# Patient Record
Sex: MALE | Race: OTHER | HISPANIC OR LATINO | URBAN - METROPOLITAN AREA
[De-identification: names, ages, dates, MRNs, and addresses within clinical notes are randomized per-mention and may not be internally consistent; named-entity substitution may affect disease eponyms.]

---

## 2018-06-16 ENCOUNTER — EMERGENCY (EMERGENCY)
Facility: HOSPITAL | Age: 23
LOS: 1 days | Discharge: HOME | End: 2018-06-16
Attending: EMERGENCY MEDICINE
Payer: MEDICAID

## 2018-06-16 VITALS
DIASTOLIC BLOOD PRESSURE: 91 MMHG | OXYGEN SATURATION: 97 % | SYSTOLIC BLOOD PRESSURE: 136 MMHG | RESPIRATION RATE: 20 BRPM | HEART RATE: 99 BPM | TEMPERATURE: 98 F

## 2018-06-16 VITALS — HEIGHT: 69 IN | WEIGHT: 199.96 LBS

## 2018-06-16 PROBLEM — Z00.00 ENCOUNTER FOR PREVENTIVE HEALTH EXAMINATION: Status: ACTIVE | Noted: 2018-06-16

## 2018-06-16 PROCEDURE — 99282 EMERGENCY DEPT VISIT SF MDM: CPT

## 2018-06-16 NOTE — ED PROVIDER NOTE - PHYSICAL EXAMINATION
Right arm: + multiple superficial abrasions to right arm, no palpable or visualized foreign bodies noted , NVI distally, + FROM of right arm, no erythema, no swelling, no bony tenderness
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
## 2018-06-16 NOTE — ED PROVIDER NOTE - MEDICAL DECISION MAKING DETAILS
22 y/o male presents with multiple superficial wounds to right arm, refusing x-rays however no palpable or visualized foreign bodies noted, PT educated that there is still a possibility of foreign body and warning signs discussed,, local wound care, follow up pmd , [facility] was in ED to take report

## 2018-06-16 NOTE — ED PROVIDER NOTE - PROGRESS NOTE DETAILS
PT refused x-rays PT refused x-rays, PT does not believe there is any retained foreign bodies in his arm
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
## 2023-06-06 ENCOUNTER — OFFICE (OUTPATIENT)
Dept: URBAN - METROPOLITAN AREA CLINIC 6 | Facility: CLINIC | Age: 28
Setting detail: OPHTHALMOLOGY
End: 2023-06-06
Payer: COMMERCIAL

## 2023-06-06 DIAGNOSIS — H40.1132: ICD-10-CM

## 2023-06-06 DIAGNOSIS — Q15.0: ICD-10-CM

## 2023-06-06 PROCEDURE — 99213 OFFICE O/P EST LOW 20 MIN: CPT | Performed by: OPHTHALMOLOGY

## 2023-06-06 ASSESSMENT — SPHEQUIV_DERIVED
OD_SPHEQUIV: 1
OS_SPHEQUIV: -1
OS_SPHEQUIV: -1.625
OS_SPHEQUIV: -0.875
OD_SPHEQUIV: 0.75
OD_SPHEQUIV: 1

## 2023-06-06 ASSESSMENT — REFRACTION_MANIFEST
OD_VA1: 20/40
OS_SPHERE: -0.50
OD_SPHERE: +2.00
OD_CYLINDER: -2.00
OD_AXIS: 95
OD_CYLINDER: -2.50
OD_AXIS: 95
OS_VA1: 20/50
OS_AXIS: 105
OD_VA1: 20/40
OS_CYLINDER: -0.75
OS_SPHERE: -0.50
OS_VA1: 20/50
OD_SPHERE: +2.25
OS_CYLINDER: -1.00
OS_AXIS: 105

## 2023-06-06 ASSESSMENT — REFRACTION_AUTOREFRACTION
OD_AXIS: 100
OS_SPHERE: -1.25
OS_AXIS: 95
OD_SPHERE: +2.00
OS_CYLINDER: -0.75
OD_CYLINDER: -2.50

## 2023-06-06 ASSESSMENT — REFRACTION_CURRENTRX
OS_OVR_VA: 20/
OD_SPHERE: +2.00
OS_AXIS: 110
OD_CYLINDER: -2.00
OD_AXIS: 98
OS_VPRISM_DIRECTION: SV
OD_OVR_VA: 20/
OS_SPHERE: -0.50
OS_CYLINDER: -1.00
OD_VPRISM_DIRECTION: SV

## 2023-06-06 ASSESSMENT — KERATOMETRY
OD_AXISANGLE_DEGREES: 12
OS_AXISANGLE_DEGREES: 162
OS_K2POWER_DIOPTERS: 42.00
OD_K2POWER_DIOPTERS: 43.00
OD_K1POWER_DIOPTERS: 40.25
OS_K1POWER_DIOPTERS: 41.50
METHOD_AUTO_MANUAL: AUTO

## 2023-06-06 ASSESSMENT — TONOMETRY
OS_IOP_MMHG: 21
OD_IOP_MMHG: 21

## 2023-06-06 ASSESSMENT — AXIALLENGTH_DERIVED
OS_AL: 24.6184
OD_AL: 23.8948
OD_AL: 23.9952
OS_AL: 24.9407
OS_AL: 24.6715
OD_AL: 23.8948

## 2023-06-06 ASSESSMENT — PACHYMETRY
OD_CT_UM: 569
OD_CT_CORRECTION: -1
OS_CT_UM: 553
OS_CT_CORRECTION: -1

## 2023-06-06 ASSESSMENT — VISUAL ACUITY
OD_BCVA: 20/60
OS_BCVA: 20/40-1

## 2023-06-06 ASSESSMENT — CONFRONTATIONAL VISUAL FIELD TEST (CVF)
OS_FINDINGS: FULL
OD_FINDINGS: FULL

## 2023-09-15 ENCOUNTER — OFFICE (OUTPATIENT)
Dept: URBAN - METROPOLITAN AREA CLINIC 6 | Facility: CLINIC | Age: 28
Setting detail: OPHTHALMOLOGY
End: 2023-09-15
Payer: COMMERCIAL

## 2023-09-15 DIAGNOSIS — Q15.0: ICD-10-CM

## 2023-09-15 DIAGNOSIS — H40.1132: ICD-10-CM

## 2023-09-15 PROCEDURE — 92083 EXTENDED VISUAL FIELD XM: CPT | Performed by: OPHTHALMOLOGY

## 2023-09-15 PROCEDURE — 92133 CPTRZD OPH DX IMG PST SGM ON: CPT | Performed by: OPHTHALMOLOGY

## 2023-09-15 PROCEDURE — 99213 OFFICE O/P EST LOW 20 MIN: CPT | Performed by: OPHTHALMOLOGY

## 2023-09-15 ASSESSMENT — VISUAL ACUITY
OS_BCVA: 20/60-1
OD_BCVA: 20/60-1

## 2023-09-15 ASSESSMENT — CONFRONTATIONAL VISUAL FIELD TEST (CVF)
OD_FINDINGS: FULL
OS_FINDINGS: FULL

## 2023-09-15 ASSESSMENT — AXIALLENGTH_DERIVED
OS_AL: 24.6214
OD_AL: 24.2527
OS_AL: 24.8355
OD_AL: 23.8478
OD_AL: 23.8478
OS_AL: 24.5685

## 2023-09-15 ASSESSMENT — REFRACTION_MANIFEST
OD_VA1: 20/40
OS_VA1: 20/50
OD_CYLINDER: -2.50
OD_AXIS: 95
OD_CYLINDER: -2.00
OS_CYLINDER: -1.00
OS_SPHERE: -0.50
OD_SPHERE: +2.00
OD_VA1: 20/40
OD_AXIS: 95
OS_VA1: 20/50
OS_CYLINDER: -0.75
OD_SPHERE: +2.25
OS_AXIS: 105
OS_SPHERE: -0.50
OS_AXIS: 105

## 2023-09-15 ASSESSMENT — REFRACTION_CURRENTRX
OD_OVR_VA: 20/
OD_AXIS: 93
OS_CYLINDER: -0.75
OS_VPRISM_DIRECTION: SV
OS_OVR_VA: 20/
OD_SPHERE: +1.75
OS_SPHERE: -0.50
OD_CYLINDER: -1.75
OD_VPRISM_DIRECTION: SV
OS_AXIS: 97

## 2023-09-15 ASSESSMENT — TONOMETRY
OS_IOP_MMHG: 20
OD_IOP_MMHG: 20

## 2023-09-15 ASSESSMENT — KERATOMETRY
OS_AXISANGLE_DEGREES: 170
OD_K2POWER_DIOPTERS: 43.25
OD_K1POWER_DIOPTERS: 40.25
OS_K1POWER_DIOPTERS: 41.50
METHOD_AUTO_MANUAL: AUTO
OD_AXISANGLE_DEGREES: 11
OS_K2POWER_DIOPTERS: 42.25

## 2023-09-15 ASSESSMENT — REFRACTION_AUTOREFRACTION
OS_AXIS: 105
OS_CYLINDER: -0.50
OS_SPHERE: -1.25
OD_AXIS: 95
OD_SPHERE: +1.25
OD_CYLINDER: -2.50

## 2023-09-15 ASSESSMENT — SPHEQUIV_DERIVED
OS_SPHEQUIV: -1.5
OD_SPHEQUIV: 1
OD_SPHEQUIV: 0
OS_SPHEQUIV: -1
OS_SPHEQUIV: -0.875
OD_SPHEQUIV: 1

## 2023-09-15 ASSESSMENT — PACHYMETRY
OS_CT_CORRECTION: -1
OD_CT_CORRECTION: -1
OS_CT_UM: 553
OD_CT_UM: 569

## 2023-12-12 ENCOUNTER — OFFICE (OUTPATIENT)
Dept: URBAN - METROPOLITAN AREA CLINIC 6 | Facility: CLINIC | Age: 28
Setting detail: OPHTHALMOLOGY
End: 2023-12-12
Payer: COMMERCIAL

## 2023-12-12 DIAGNOSIS — Q15.0: ICD-10-CM

## 2023-12-12 DIAGNOSIS — H40.1132: ICD-10-CM

## 2023-12-12 PROCEDURE — 92014 COMPRE OPH EXAM EST PT 1/>: CPT | Performed by: OPHTHALMOLOGY

## 2023-12-12 PROCEDURE — 92250 FUNDUS PHOTOGRAPHY W/I&R: CPT | Performed by: OPHTHALMOLOGY

## 2023-12-12 ASSESSMENT — REFRACTION_CURRENTRX
OS_VPRISM_DIRECTION: SV
OD_AXIS: 100
OS_AXIS: 100
OS_CYLINDER: -0.75
OS_OVR_VA: 20/
OD_OVR_VA: 20/
OD_SPHERE: +2.00
OD_VPRISM_DIRECTION: SV
OS_SPHERE: -0.50
OD_CYLINDER: -2.00

## 2023-12-12 ASSESSMENT — REFRACTION_AUTOREFRACTION
OS_CYLINDER: -0.75
OD_CYLINDER: -2.75
OD_SPHERE: +2.50
OS_AXIS: 101
OD_AXIS: 096
OS_SPHERE: -1.75

## 2023-12-12 ASSESSMENT — REFRACTION_MANIFEST
OD_AXIS: 95
OS_VA1: 20/50
OD_AXIS: 095
OS_SPHERE: -1.75
OD_VA1: 20/40
OU_VA: 20/30-1
OS_AXIS: 100
OD_SPHERE: +2.00
OD_VA1: 20/50-1
OS_CYLINDER: -0.75
OD_CYLINDER: -2.00
OS_AXIS: 105
OD_CYLINDER: -2.75
OD_SPHERE: +2.50
OS_CYLINDER: -0.75
OS_SPHERE: -0.50
OS_VA1: 20/80+1

## 2023-12-12 ASSESSMENT — SPHEQUIV_DERIVED
OD_SPHEQUIV: 1.125
OS_SPHEQUIV: -2.125
OD_SPHEQUIV: 1.125
OS_SPHEQUIV: -2.125
OD_SPHEQUIV: 1
OS_SPHEQUIV: -0.875

## 2023-12-12 ASSESSMENT — CONFRONTATIONAL VISUAL FIELD TEST (CVF)
OS_FINDINGS: FULL
OD_FINDINGS: FULL

## 2024-01-23 ENCOUNTER — OFFICE (OUTPATIENT)
Dept: URBAN - METROPOLITAN AREA CLINIC 6 | Facility: CLINIC | Age: 29
Setting detail: OPHTHALMOLOGY
End: 2024-01-23
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
## 2024-01-23 PROCEDURE — NO SHOW FE NO SHOW FEE: Performed by: OPHTHALMOLOGY

## 2024-02-20 ENCOUNTER — OFFICE (OUTPATIENT)
Dept: URBAN - METROPOLITAN AREA CLINIC 6 | Facility: CLINIC | Age: 29
Setting detail: OPHTHALMOLOGY
End: 2024-02-20
Payer: COMMERCIAL

## 2024-02-20 DIAGNOSIS — H40.1132: ICD-10-CM

## 2024-02-20 DIAGNOSIS — Q15.0: ICD-10-CM

## 2024-02-20 PROCEDURE — 99213 OFFICE O/P EST LOW 20 MIN: CPT | Performed by: OPHTHALMOLOGY

## 2024-02-20 ASSESSMENT — REFRACTION_MANIFEST
OD_VA1: 20/50-1
OD_CYLINDER: -2.75
OD_AXIS: 95
OD_SPHERE: +2.50
OD_VA1: 20/40
OD_CYLINDER: -2.00
OS_CYLINDER: -0.75
OS_VA1: 20/80+1
OS_VA1: 20/50
OS_AXIS: 105
OS_CYLINDER: -0.75
OS_SPHERE: -0.50
OS_SPHERE: -1.75
OU_VA: 20/30-1
OD_AXIS: 095
OD_SPHERE: +2.00
OS_AXIS: 100

## 2024-02-20 ASSESSMENT — CONFRONTATIONAL VISUAL FIELD TEST (CVF)
OS_FINDINGS: FULL
OD_FINDINGS: FULL

## 2024-02-20 ASSESSMENT — REFRACTION_AUTOREFRACTION
OD_SPHERE: +2.50
OD_AXIS: 096
OD_CYLINDER: -2.75
OS_SPHERE: -1.75
OS_CYLINDER: -0.75
OS_AXIS: 101

## 2024-02-20 ASSESSMENT — REFRACTION_CURRENTRX
OS_AXIS: 110
OS_OVR_VA: 20/
OD_CYLINDER: -2.00
OS_CYLINDER: -0.75
OS_SPHERE: -0.75
OD_SPHERE: +2.00
OS_VPRISM_DIRECTION: SV
OD_OVR_VA: 20/
OD_AXIS: 090
OD_VPRISM_DIRECTION: SV

## 2024-02-20 ASSESSMENT — SPHEQUIV_DERIVED
OS_SPHEQUIV: -0.875
OS_SPHEQUIV: -2.125
OD_SPHEQUIV: 1
OS_SPHEQUIV: -2.125
OD_SPHEQUIV: 1.125
OD_SPHEQUIV: 1.125

## 2024-02-22 NOTE — ED PROVIDER NOTE - NS HIV RISK FACTOR YES
Echo shows-   Left ventricular systolic function is hyperdynamic with an ejection fraction of 80 %, there are no regional wall motion abnormalities seen. There is mild (grade 1) left ventricular diastolic dysfunction.  This is an incomplete study for evaluation of aortic stenosis. There is fusion of the non-coronary and left coronary cusps. Visually, the aortic valve appears severely stenotic but the ADELINA is moderate in severity and the hemodynamic data is not supportive of significant aortic stenosis. There is conflicting hemodynamic data and ADELINA.  moderate mitral valve stenosis. The transmitral peak gradient is 14.1 mmHg and mean gradient is 6.00 mmHg at a heart rate of 70 bpm.    CTH, CT neck angio, C-spines, X-ray pelvis- neg studies Declined

## 2024-05-13 ENCOUNTER — EMERGENCY (EMERGENCY)
Facility: HOSPITAL | Age: 29
LOS: 1 days | Discharge: HOME | End: 2024-05-13
Attending: STUDENT IN AN ORGANIZED HEALTH CARE EDUCATION/TRAINING PROGRAM
Payer: COMMERCIAL

## 2024-05-13 VITALS
DIASTOLIC BLOOD PRESSURE: 100 MMHG | OXYGEN SATURATION: 98 % | SYSTOLIC BLOOD PRESSURE: 146 MMHG | RESPIRATION RATE: 16 BRPM | HEART RATE: 83 BPM | TEMPERATURE: 98 F

## 2024-05-13 LAB
AMORPH URATE CRY # URNS: PRESENT
APPEARANCE UR: CLEAR — SIGNIFICANT CHANGE UP
BACTERIA # UR AUTO: NEGATIVE /HPF — SIGNIFICANT CHANGE UP
BILIRUB UR-MCNC: NEGATIVE — SIGNIFICANT CHANGE UP
CAST: 1 /LPF — SIGNIFICANT CHANGE UP (ref 0–4)
COLOR SPEC: YELLOW — SIGNIFICANT CHANGE UP
DIFF PNL FLD: ABNORMAL
GLUCOSE UR QL: NEGATIVE MG/DL — SIGNIFICANT CHANGE UP
KETONES UR-MCNC: NEGATIVE MG/DL — SIGNIFICANT CHANGE UP
LEUKOCYTE ESTERASE UR-ACNC: NEGATIVE — SIGNIFICANT CHANGE UP
NITRITE UR-MCNC: NEGATIVE — SIGNIFICANT CHANGE UP
PH UR: 5.5 — SIGNIFICANT CHANGE UP (ref 5–8)
PROT UR-MCNC: NEGATIVE MG/DL — SIGNIFICANT CHANGE UP
RBC CASTS # UR COMP ASSIST: 2 /HPF — SIGNIFICANT CHANGE UP (ref 0–4)
SP GR SPEC: 1.02 — SIGNIFICANT CHANGE UP (ref 1–1.03)
SQUAMOUS # UR AUTO: 0 /HPF — SIGNIFICANT CHANGE UP (ref 0–5)
UROBILINOGEN FLD QL: 0.2 MG/DL — SIGNIFICANT CHANGE UP (ref 0.2–1)
WBC UR QL: 1 /HPF — SIGNIFICANT CHANGE UP (ref 0–5)

## 2024-05-13 PROCEDURE — 87086 URINE CULTURE/COLONY COUNT: CPT

## 2024-05-13 PROCEDURE — 76870 US EXAM SCROTUM: CPT | Mod: 26

## 2024-05-13 PROCEDURE — 99284 EMERGENCY DEPT VISIT MOD MDM: CPT

## 2024-05-13 PROCEDURE — 76870 US EXAM SCROTUM: CPT

## 2024-05-13 PROCEDURE — 81001 URINALYSIS AUTO W/SCOPE: CPT

## 2024-05-13 RX ORDER — ACETAMINOPHEN 500 MG
975 TABLET ORAL ONCE
Refills: 0 | Status: COMPLETED | OUTPATIENT
Start: 2024-05-13 | End: 2024-05-13

## 2024-05-13 NOTE — ED PROVIDER NOTE - PATIENT PORTAL LINK FT
You can access the FollowMyHealth Patient Portal offered by [facility] by registering at the following website: [email or web address]. By joining [facility]’s FollowMyHealth portal, you will also be able to view your health information using other applications (apps) compatible with our system.

## 2024-05-13 NOTE — ED PROVIDER NOTE - ATTENDING APP SHARED VISIT CONTRIBUTION OF CARE
I, Luis Eduardo Frausto, personally saw the patient with the PA, and completed the key components of the history and physical exam. I then discussed the management plan with the PA.

## 2024-05-13 NOTE — ED PROVIDER NOTE - OBJECTIVE STATEMENT
29 yo male PMHx glaucoma presents to ED c/o right testicular pain x 2 hours ago. Described as tingling and cold. Began after sitting down. Self medicated with ibuprofen 800mg at 11:45pm. No further complaints at this time. 29 yo male PMHx glaucoma presents to ED c/o right testicular pain x 2 hours ago. Described as tingling and cold. Began after sitting down. Worse with sitting. Self medicated with ibuprofen 800mg at 11:45pm. Notes remote history of hematuria. No further complaints at this time.  Denies h/o kidney stones, abdominal pain, back pain, urinary sxms, penile discharge. 29 yo male PMHx glaucoma presents to ED c/o right testicular pain x 2 hours ago. Described as tingling and cold. Began after sitting down. Worse with sitting. Self medicated with ibuprofen 800mg at 11:45pm. Notes remote history of hematuria. No further complaints at this time.  Denies h/o kidney stones, trauma, abdominal pain, back pain, urinary sxms, penile discharge.

## 2024-05-13 NOTE — ED ADULT NURSE NOTE - OBJECTIVE STATEMENT
patient complaining of right testicular pain that started at 2315. pt states that he was seated for an extended period of time and when he got up from the chair then pain started. patient denies injury, hematuria or urinary symptoms.

## 2024-05-13 NOTE — ED PROVIDER NOTE - CLINICAL SUMMARY MEDICAL DECISION MAKING FREE TEXT BOX
27 yo male PMHx glaucoma presents to ED c/o right testicular pain x 2 hours ago. Immediately sent to US. Known epididymal cyst again seen on US, no evidence of torsion. UA with blood and crystal. Offered CT for renal stone hunt. Continues to not have back/flank or abdominal pain. Benign exam. Starts new job in several hours and wishes to forgot further work up at this time. Return precautions. Medically stable for discharge. 29 yo male PMHx glaucoma presents to ED c/o right testicular pain x 2 hours ago. Immediately sent to US. Known epididymal cyst again seen on US, no evidence of torsion. UA with blood and crystal. Offered CT for renal stone hunt. Continues to not have back/flank or abdominal pain. Benign exam. Overall feels better at discharge. Starts new job in several hours and wishes to forgot further work up at this time. Return precautions. Medically stable for discharge.

## 2024-05-13 NOTE — ED PROVIDER NOTE - PHYSICAL EXAMINATION
Gen: Nontoxic, well appearing, but uncomfortable appearing.   Skin: Warm and dry as visualized.  Head: NC/AT.  Eyes: PERRLA. EOMI.  Neck: Supple, FROM. Trachea midline.   Resp: No distress.  Cardio: Well perfused.  Abd: Nondistended. Soft, nontender. No guarding. No CVAT.   : Uncircumcised. No penile lesions or discharge. Scrotal sac tense.   Ext: No deformities. MAEx4. FROM.   Neuro: A&Ox3. Appears nonfocal.   Psych: Normal affect and mood.

## 2024-05-13 NOTE — ED PROVIDER NOTE - PROGRESS NOTE DETAILS
VAHID Campos: US results discussed. Known epididymal cyst. UA with blood and crystal. Offered CT for renal stone hunt. Continues to not have back/flank or abdominal pain. Benign exam. Starts new job in several hours and wishes to forgot further work up at this time. Return precautions. Medically stable for discharge. VAHID Campos: Feels better at this time. US results discussed. Known epididymal cyst. UA with blood and crystal. Offered CT for renal stone hunt. Continues to not have back/flank or abdominal pain. Benign exam. Starts new job in several hours and wishes to forgot further work up at this time. Return precautions. Medically stable for discharge.

## 2024-05-13 NOTE — ED PROVIDER NOTE - CARE PROVIDER_API CALL
Lavon Michaud  Urology  [address], [address]  [place] [address]  Phone: [phone]  Fax: [phone]  Follow Up Time:

## 2024-05-14 LAB
CULTURE RESULTS: SIGNIFICANT CHANGE UP
SPECIMEN SOURCE: SIGNIFICANT CHANGE UP

## 2024-05-21 ENCOUNTER — OFFICE (OUTPATIENT)
Dept: URBAN - METROPOLITAN AREA CLINIC 6 | Facility: CLINIC | Age: 29
Setting detail: OPHTHALMOLOGY
End: 2024-05-21
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
## 2024-05-21 PROCEDURE — NO SHOW FE NO SHOW FEE: Performed by: OPHTHALMOLOGY

## 2024-06-17 ENCOUNTER — NON-APPOINTMENT (OUTPATIENT)
Age: 29
End: 2024-06-17

## 2024-06-25 ENCOUNTER — OFFICE (OUTPATIENT)
Dept: URBAN - METROPOLITAN AREA CLINIC 6 | Facility: CLINIC | Age: 29
Setting detail: OPHTHALMOLOGY
End: 2024-06-25
Payer: COMMERCIAL
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
## 2024-06-25 DIAGNOSIS — H40.1132: ICD-10-CM

## 2024-06-25 DIAGNOSIS — Q15.0: ICD-10-CM

## 2024-06-25 PROCEDURE — 92012 INTRM OPH EXAM EST PATIENT: CPT | Performed by: OPHTHALMOLOGY

## 2024-06-25 PROCEDURE — 92310 CONTACT LENS FITTING OU: CPT | Performed by: OPHTHALMOLOGY

## 2024-06-25 PROCEDURE — 92133 CPTRZD OPH DX IMG PST SGM ON: CPT | Performed by: OPHTHALMOLOGY

## 2024-06-25 ASSESSMENT — CONFRONTATIONAL VISUAL FIELD TEST (CVF)
OD_FINDINGS: FULL
OS_FINDINGS: FULL

## 2024-07-04 ENCOUNTER — EMERGENCY (EMERGENCY)
Facility: HOSPITAL | Age: 29
LOS: 1 days | Discharge: HOME | End: 2024-07-04
Attending: EMERGENCY MEDICINE
Payer: COMMERCIAL

## 2024-07-04 VITALS
TEMPERATURE: 101 F | SYSTOLIC BLOOD PRESSURE: 129 MMHG | RESPIRATION RATE: 20 BRPM | WEIGHT: 245.15 LBS | DIASTOLIC BLOOD PRESSURE: 79 MMHG | OXYGEN SATURATION: 97 % | HEIGHT: 68 IN | HEART RATE: 101 BPM

## 2024-07-04 LAB
BASOPHILS # BLD AUTO: 0.07 K/UL — SIGNIFICANT CHANGE UP (ref 0–0.2)
BASOPHILS NFR BLD AUTO: 0.4 % — SIGNIFICANT CHANGE UP (ref 0–2)
EOSINOPHIL # BLD AUTO: 0.04 K/UL — SIGNIFICANT CHANGE UP (ref 0–0.5)
EOSINOPHIL NFR BLD AUTO: 0.2 % — SIGNIFICANT CHANGE UP (ref 0–6)
HCT VFR BLD CALC: 41.9 % — SIGNIFICANT CHANGE UP (ref 39–50)
HGB BLD-MCNC: 14.3 G/DL — SIGNIFICANT CHANGE UP (ref 13–17)
IMM GRANULOCYTES NFR BLD AUTO: 0.5 % — SIGNIFICANT CHANGE UP (ref 0–0.9)
LYMPHOCYTES # BLD AUTO: 1.73 K/UL — SIGNIFICANT CHANGE UP (ref 1–3.3)
LYMPHOCYTES # BLD AUTO: 9.9 % — LOW (ref 13–44)
MCHC RBC-ENTMCNC: 28.4 PG — SIGNIFICANT CHANGE UP (ref 27–34)
MCHC RBC-ENTMCNC: 34.1 GM/DL — SIGNIFICANT CHANGE UP (ref 32–36)
MCV RBC AUTO: 83.3 FL — SIGNIFICANT CHANGE UP (ref 80–100)
MONOCYTES # BLD AUTO: 1.37 K/UL — HIGH (ref 0–0.9)
MONOCYTES NFR BLD AUTO: 7.8 % — SIGNIFICANT CHANGE UP (ref 2–14)
NEUTROPHILS # BLD AUTO: 14.26 K/UL — HIGH (ref 1.8–7.4)
NEUTROPHILS NFR BLD AUTO: 81.2 % — HIGH (ref 43–77)
PLATELET # BLD AUTO: 180 K/UL — SIGNIFICANT CHANGE UP (ref 150–400)
RBC # BLD: 5.03 M/UL — SIGNIFICANT CHANGE UP (ref 4.2–5.8)
RBC # FLD: 12.9 % — SIGNIFICANT CHANGE UP (ref 10.3–14.5)
WBC # BLD: 17.56 K/UL — HIGH (ref 3.8–10.5)
WBC # FLD AUTO: 17.56 K/UL — HIGH (ref 3.8–10.5)

## 2024-07-04 PROCEDURE — 99285 EMERGENCY DEPT VISIT HI MDM: CPT

## 2024-07-04 RX ORDER — PIPERACILLIN SODIUM AND TAZOBACTAM SODIUM 3; .375 G/15ML; G/15ML
3.38 INJECTION, POWDER, LYOPHILIZED, FOR SOLUTION INTRAVENOUS ONCE
Refills: 0 | Status: COMPLETED | OUTPATIENT
Start: 2024-07-04 | End: 2024-07-04

## 2024-07-04 RX ORDER — ACETAMINOPHEN 325 MG
1000 TABLET ORAL ONCE
Refills: 0 | Status: COMPLETED | OUTPATIENT
Start: 2024-07-04 | End: 2024-07-04

## 2024-07-04 RX ORDER — MORPHINE SULFATE 100 MG/1
4 TABLET, EXTENDED RELEASE ORAL ONCE
Refills: 0 | Status: DISCONTINUED | OUTPATIENT
Start: 2024-07-04 | End: 2024-07-04

## 2024-07-04 RX ORDER — DEXTROSE MONOHYDRATE AND SODIUM CHLORIDE 5; .3 G/100ML; G/100ML
1000 INJECTION, SOLUTION INTRAVENOUS ONCE
Refills: 0 | Status: COMPLETED | OUTPATIENT
Start: 2024-07-04 | End: 2024-07-04

## 2024-07-04 RX ADMIN — MORPHINE SULFATE 4 MILLIGRAM(S): 100 TABLET, EXTENDED RELEASE ORAL at 23:52

## 2024-07-04 RX ADMIN — Medication 400 MILLIGRAM(S): at 23:52

## 2024-07-05 VITALS
TEMPERATURE: 98 F | DIASTOLIC BLOOD PRESSURE: 68 MMHG | SYSTOLIC BLOOD PRESSURE: 129 MMHG | RESPIRATION RATE: 16 BRPM | OXYGEN SATURATION: 98 % | HEART RATE: 78 BPM

## 2024-07-05 LAB
ALBUMIN SERPL ELPH-MCNC: 4 G/DL — SIGNIFICANT CHANGE UP (ref 3.3–5.2)
ALP SERPL-CCNC: 108 U/L — SIGNIFICANT CHANGE UP (ref 40–120)
ALT FLD-CCNC: 19 U/L — SIGNIFICANT CHANGE UP
ANION GAP SERPL CALC-SCNC: 15 MMOL/L — SIGNIFICANT CHANGE UP (ref 5–17)
APPEARANCE UR: CLEAR — SIGNIFICANT CHANGE UP
AST SERPL-CCNC: 17 U/L — SIGNIFICANT CHANGE UP
BACTERIA # UR AUTO: NEGATIVE /HPF — SIGNIFICANT CHANGE UP
BILIRUB SERPL-MCNC: 0.4 MG/DL — SIGNIFICANT CHANGE UP (ref 0.4–2)
BILIRUB UR-MCNC: NEGATIVE — SIGNIFICANT CHANGE UP
BUN SERPL-MCNC: 16.2 MG/DL — SIGNIFICANT CHANGE UP (ref 8–20)
CALCIUM SERPL-MCNC: 8.6 MG/DL — SIGNIFICANT CHANGE UP (ref 8.4–10.5)
CAST: 1 /LPF — SIGNIFICANT CHANGE UP (ref 0–4)
CHLORIDE SERPL-SCNC: 102 MMOL/L — SIGNIFICANT CHANGE UP (ref 96–108)
CO2 SERPL-SCNC: 20 MMOL/L — LOW (ref 22–29)
COLOR SPEC: YELLOW — SIGNIFICANT CHANGE UP
CREAT SERPL-MCNC: 0.89 MG/DL — SIGNIFICANT CHANGE UP (ref 0.5–1.3)
DIFF PNL FLD: ABNORMAL
EGFR: 119 ML/MIN/1.73M2 — SIGNIFICANT CHANGE UP
GLUCOSE SERPL-MCNC: 115 MG/DL — HIGH (ref 70–99)
GLUCOSE UR QL: NEGATIVE MG/DL — SIGNIFICANT CHANGE UP
KETONES UR-MCNC: NEGATIVE MG/DL — SIGNIFICANT CHANGE UP
LACTATE BLDV-MCNC: 1.2 MMOL/L — SIGNIFICANT CHANGE UP (ref 0.5–2)
LEUKOCYTE ESTERASE UR-ACNC: NEGATIVE — SIGNIFICANT CHANGE UP
LIDOCAIN IGE QN: 12 U/L — LOW (ref 22–51)
NITRITE UR-MCNC: NEGATIVE — SIGNIFICANT CHANGE UP
PH UR: 6 — SIGNIFICANT CHANGE UP (ref 5–8)
POTASSIUM SERPL-MCNC: 4 MMOL/L — SIGNIFICANT CHANGE UP (ref 3.5–5.3)
POTASSIUM SERPL-SCNC: 4 MMOL/L — SIGNIFICANT CHANGE UP (ref 3.5–5.3)
PROT SERPL-MCNC: 6.7 G/DL — SIGNIFICANT CHANGE UP (ref 6.6–8.7)
PROT UR-MCNC: NEGATIVE MG/DL — SIGNIFICANT CHANGE UP
RBC CASTS # UR COMP ASSIST: 8 /HPF — HIGH (ref 0–4)
SODIUM SERPL-SCNC: 137 MMOL/L — SIGNIFICANT CHANGE UP (ref 135–145)
SP GR SPEC: >1.03 — HIGH (ref 1–1.03)
SQUAMOUS # UR AUTO: 0 /HPF — SIGNIFICANT CHANGE UP (ref 0–5)
UROBILINOGEN FLD QL: 0.2 MG/DL — SIGNIFICANT CHANGE UP (ref 0.2–1)
WBC UR QL: 0 /HPF — SIGNIFICANT CHANGE UP (ref 0–5)

## 2024-07-05 PROCEDURE — 74177 CT ABD & PELVIS W/CONTRAST: CPT | Mod: 26,MC

## 2024-07-05 PROCEDURE — 80053 COMPREHEN METABOLIC PANEL: CPT

## 2024-07-05 PROCEDURE — 96375 TX/PRO/DX INJ NEW DRUG ADDON: CPT

## 2024-07-05 PROCEDURE — 96365 THER/PROPH/DIAG IV INF INIT: CPT | Mod: XU

## 2024-07-05 PROCEDURE — 83605 ASSAY OF LACTIC ACID: CPT

## 2024-07-05 PROCEDURE — 93010 ELECTROCARDIOGRAM REPORT: CPT

## 2024-07-05 PROCEDURE — 74177 CT ABD & PELVIS W/CONTRAST: CPT | Mod: MC

## 2024-07-05 PROCEDURE — 81001 URINALYSIS AUTO W/SCOPE: CPT

## 2024-07-05 PROCEDURE — 83690 ASSAY OF LIPASE: CPT

## 2024-07-05 PROCEDURE — 87086 URINE CULTURE/COLONY COUNT: CPT

## 2024-07-05 PROCEDURE — 85025 COMPLETE CBC W/AUTO DIFF WBC: CPT

## 2024-07-05 PROCEDURE — 93005 ELECTROCARDIOGRAM TRACING: CPT

## 2024-07-05 PROCEDURE — 36415 COLL VENOUS BLD VENIPUNCTURE: CPT

## 2024-07-05 PROCEDURE — 99285 EMERGENCY DEPT VISIT HI MDM: CPT | Mod: 25

## 2024-07-05 PROCEDURE — 87040 BLOOD CULTURE FOR BACTERIA: CPT

## 2024-07-05 RX ORDER — AMOXICILLIN/POTASSIUM CLAV 250-125 MG
1 TABLET ORAL
Qty: 1 | Refills: 0
Start: 2024-07-05 | End: 2024-07-09

## 2024-07-05 RX ORDER — KETOROLAC TROMETHAMINE 30 MG/ML
30 INJECTION, SOLUTION INTRAMUSCULAR ONCE
Refills: 0 | Status: DISCONTINUED | OUTPATIENT
Start: 2024-07-05 | End: 2024-07-05

## 2024-07-05 RX ORDER — OXYCODONE HYDROCHLORIDE 100 MG/5ML
1 SOLUTION ORAL
Qty: 9 | Refills: 0
Start: 2024-07-05 | End: 2024-07-07

## 2024-07-05 RX ADMIN — DEXTROSE MONOHYDRATE AND SODIUM CHLORIDE 1000 MILLILITER(S): 5; .3 INJECTION, SOLUTION INTRAVENOUS at 00:12

## 2024-07-05 RX ADMIN — MORPHINE SULFATE 4 MILLIGRAM(S): 100 TABLET, EXTENDED RELEASE ORAL at 00:31

## 2024-07-05 RX ADMIN — KETOROLAC TROMETHAMINE 30 MILLIGRAM(S): 30 INJECTION, SOLUTION INTRAMUSCULAR at 01:39

## 2024-07-05 RX ADMIN — Medication 1000 MILLIGRAM(S): at 00:32

## 2024-07-05 RX ADMIN — PIPERACILLIN SODIUM AND TAZOBACTAM SODIUM 200 GRAM(S): 3; .375 INJECTION, POWDER, LYOPHILIZED, FOR SOLUTION INTRAVENOUS at 00:12

## 2024-07-05 RX ADMIN — Medication 1000 MILLIGRAM(S): at 00:31

## 2024-07-06 LAB
CULTURE RESULTS: NO GROWTH — SIGNIFICANT CHANGE UP
SPECIMEN SOURCE: SIGNIFICANT CHANGE UP

## 2024-07-10 LAB
CULTURE RESULTS: SIGNIFICANT CHANGE UP
CULTURE RESULTS: SIGNIFICANT CHANGE UP
SPECIMEN SOURCE: SIGNIFICANT CHANGE UP
SPECIMEN SOURCE: SIGNIFICANT CHANGE UP

## 2024-11-26 ENCOUNTER — OFFICE (OUTPATIENT)
Dept: URBAN - METROPOLITAN AREA CLINIC 6 | Facility: CLINIC | Age: 29
Setting detail: OPHTHALMOLOGY
End: 2024-11-26
Payer: COMMERCIAL

## 2024-11-26 DIAGNOSIS — Q15.0: ICD-10-CM

## 2024-11-26 DIAGNOSIS — H40.1132: ICD-10-CM

## 2024-11-26 PROCEDURE — 92012 INTRM OPH EXAM EST PATIENT: CPT | Performed by: OPHTHALMOLOGY

## 2024-11-26 PROCEDURE — 92083 EXTENDED VISUAL FIELD XM: CPT | Performed by: OPHTHALMOLOGY

## 2024-11-26 ASSESSMENT — REFRACTION_MANIFEST
OS_CYLINDER: -0.75
OD_AXIS: 095
OS_SPHERE: -1.75
OS_SPHERE: -0.50
OD_CYLINDER: -2.50
OD_VA1: 20/50-1
OD_CYLINDER: -2.00
OD_SPHERE: +2.00
OD_SPHERE: +1.75
OU_VA: 20/30-1
OD_VA1: 20/40
OS_VA1: 20/80+1
OS_AXIS: 105
OS_VA1: 20/50
OS_AXIS: 106
OD_AXIS: 95
OS_CYLINDER: -0.75

## 2024-11-26 ASSESSMENT — REFRACTION_CURRENTRX
OS_SPHERE: -0.75
OS_VPRISM_DIRECTION: SV
OS_OVR_VA: 20/
OD_OVR_VA: 20/
OS_CYLINDER: -0.75
OS_AXIS: 105
OD_AXIS: 100
OD_SPHERE: +2.00
OD_VPRISM_DIRECTION: SV
OD_CYLINDER: -2.00

## 2024-11-26 ASSESSMENT — KERATOMETRY
OD_K2POWER_DIOPTERS: 43.25
OS_K2POWER_DIOPTERS: 42.25
OS_AXISANGLE_DEGREES: 005
OD_AXISANGLE_DEGREES: 011
OS_K1POWER_DIOPTERS: 41.75
METHOD_AUTO_MANUAL: AUTO
OD_K1POWER_DIOPTERS: 40.25

## 2024-11-26 ASSESSMENT — CONFRONTATIONAL VISUAL FIELD TEST (CVF)
OD_FINDINGS: FULL
OS_FINDINGS: FULL

## 2024-11-26 ASSESSMENT — REFRACTION_AUTOREFRACTION
OD_AXIS: 095
OS_CYLINDER: -0.75
OD_SPHERE: +1.75
OS_AXIS: 106
OD_CYLINDER: -2.50
OS_SPHERE: -1.50

## 2024-11-26 ASSESSMENT — PACHYMETRY
OD_CT_UM: 569
OS_CT_UM: 553
OS_CT_CORRECTION: -1
OD_CT_CORRECTION: -1

## 2024-11-26 ASSESSMENT — VISUAL ACUITY
OS_BCVA: 20/40-2
OD_BCVA: 20/70

## 2024-12-31 ENCOUNTER — OFFICE (OUTPATIENT)
Dept: URBAN - METROPOLITAN AREA CLINIC 6 | Facility: CLINIC | Age: 29
Setting detail: OPHTHALMOLOGY
End: 2024-12-31
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
## 2024-12-31 PROCEDURE — NO SHOW FE NO SHOW FEE: Performed by: OPHTHALMOLOGY
